# Patient Record
Sex: FEMALE | Race: OTHER | HISPANIC OR LATINO
[De-identification: names, ages, dates, MRNs, and addresses within clinical notes are randomized per-mention and may not be internally consistent; named-entity substitution may affect disease eponyms.]

---

## 2017-06-28 ENCOUNTER — RESULT REVIEW (OUTPATIENT)
Age: 57
End: 2017-06-28

## 2018-05-04 ENCOUNTER — APPOINTMENT (OUTPATIENT)
Dept: ORTHOPEDIC SURGERY | Facility: CLINIC | Age: 58
End: 2018-05-04
Payer: MEDICAID

## 2018-05-04 VITALS
HEIGHT: 59 IN | BODY MASS INDEX: 22.58 KG/M2 | SYSTOLIC BLOOD PRESSURE: 126 MMHG | DIASTOLIC BLOOD PRESSURE: 77 MMHG | HEART RATE: 73 BPM | WEIGHT: 112 LBS

## 2018-05-04 DIAGNOSIS — M51.36 OTHER INTERVERTEBRAL DISC DEGENERATION, LUMBAR REGION: ICD-10-CM

## 2018-05-04 DIAGNOSIS — Z78.9 OTHER SPECIFIED HEALTH STATUS: ICD-10-CM

## 2018-05-04 DIAGNOSIS — Z80.3 FAMILY HISTORY OF MALIGNANT NEOPLASM OF BREAST: ICD-10-CM

## 2018-05-04 DIAGNOSIS — Z56.0 UNEMPLOYMENT, UNSPECIFIED: ICD-10-CM

## 2018-05-04 DIAGNOSIS — Z60.2 PROBLEMS RELATED TO LIVING ALONE: ICD-10-CM

## 2018-05-04 DIAGNOSIS — Z87.39 PERSONAL HISTORY OF OTHER DISEASES OF THE MUSCULOSKELETAL SYSTEM AND CONNECTIVE TISSUE: ICD-10-CM

## 2018-05-04 PROCEDURE — 99204 OFFICE O/P NEW MOD 45 MIN: CPT

## 2018-05-04 PROCEDURE — 72100 X-RAY EXAM L-S SPINE 2/3 VWS: CPT

## 2018-05-04 SDOH — SOCIAL STABILITY - SOCIAL INSECURITY: PROBLEMS RELATED TO LIVING ALONE: Z60.2

## 2018-05-04 SDOH — ECONOMIC STABILITY - INCOME SECURITY: UNEMPLOYMENT, UNSPECIFIED: Z56.0

## 2018-09-05 ENCOUNTER — LABORATORY RESULT (OUTPATIENT)
Age: 58
End: 2018-09-05

## 2018-09-06 ENCOUNTER — OUTPATIENT (OUTPATIENT)
Dept: OUTPATIENT SERVICES | Facility: HOSPITAL | Age: 58
LOS: 1 days | Discharge: HOME | End: 2018-09-06

## 2018-09-06 ENCOUNTER — APPOINTMENT (OUTPATIENT)
Dept: OTOLARYNGOLOGY | Facility: CLINIC | Age: 58
End: 2018-09-06
Payer: MEDICAID

## 2018-09-06 VITALS
HEIGHT: 59 IN | HEART RATE: 65 BPM | BODY MASS INDEX: 22.58 KG/M2 | WEIGHT: 112 LBS | DIASTOLIC BLOOD PRESSURE: 67 MMHG | SYSTOLIC BLOOD PRESSURE: 109 MMHG

## 2018-09-06 DIAGNOSIS — K14.8 OTHER DISEASES OF TONGUE: ICD-10-CM

## 2018-09-06 PROCEDURE — 41100 BIOPSY OF TONGUE: CPT

## 2018-09-06 PROCEDURE — 99204 OFFICE O/P NEW MOD 45 MIN: CPT | Mod: 25

## 2018-09-10 DIAGNOSIS — K14.8 OTHER DISEASES OF TONGUE: ICD-10-CM

## 2018-09-27 ENCOUNTER — APPOINTMENT (OUTPATIENT)
Dept: OTOLARYNGOLOGY | Facility: CLINIC | Age: 58
End: 2018-09-27
Payer: MEDICAID

## 2018-09-27 VITALS
HEIGHT: 59 IN | HEART RATE: 74 BPM | BODY MASS INDEX: 22.58 KG/M2 | WEIGHT: 112 LBS | SYSTOLIC BLOOD PRESSURE: 113 MMHG | DIASTOLIC BLOOD PRESSURE: 73 MMHG

## 2018-09-27 PROCEDURE — 99213 OFFICE O/P EST LOW 20 MIN: CPT

## 2018-10-02 DIAGNOSIS — K13.70 UNSPECIFIED LESIONS OF ORAL MUCOSA: ICD-10-CM

## 2019-03-19 ENCOUNTER — OUTPATIENT (OUTPATIENT)
Dept: OUTPATIENT SERVICES | Facility: HOSPITAL | Age: 59
LOS: 1 days | End: 2019-03-19
Payer: COMMERCIAL

## 2019-03-19 VITALS
RESPIRATION RATE: 14 BRPM | DIASTOLIC BLOOD PRESSURE: 68 MMHG | OXYGEN SATURATION: 98 % | HEART RATE: 73 BPM | SYSTOLIC BLOOD PRESSURE: 111 MMHG | TEMPERATURE: 98 F | WEIGHT: 109.35 LBS | HEIGHT: 59.5 IN

## 2019-03-19 DIAGNOSIS — T85.41XA BREAKDOWN (MECHANICAL) OF BREAST PROSTHESIS AND IMPLANT, INITIAL ENCOUNTER: ICD-10-CM

## 2019-03-19 DIAGNOSIS — Z01.818 ENCOUNTER FOR OTHER PREPROCEDURAL EXAMINATION: ICD-10-CM

## 2019-03-19 DIAGNOSIS — R92.2 INCONCLUSIVE MAMMOGRAM: ICD-10-CM

## 2019-03-19 DIAGNOSIS — Z98.82 BREAST IMPLANT STATUS: Chronic | ICD-10-CM

## 2019-03-19 DIAGNOSIS — T85.44XA CAPSULAR CONTRACTURE OF BREAST IMPLANT, INITIAL ENCOUNTER: ICD-10-CM

## 2019-03-19 PROCEDURE — G0463: CPT

## 2019-03-19 PROCEDURE — 36415 COLL VENOUS BLD VENIPUNCTURE: CPT

## 2019-03-19 NOTE — H&P PST ADULT - NSANTHOSAYNRD_GEN_A_CORE
neck circumference 12.25inches/No. FARZANA screening performed.  STOP BANG Legend: 0-2 = LOW Risk; 3-4 = INTERMEDIATE Risk; 5-8 = HIGH Risk

## 2019-03-19 NOTE — H&P PST ADULT - NSICDXPROBLEM_GEN_ALL_CORE_FT
PROBLEM DIAGNOSES  Problem: Inconclusive mammogram  Assessment and Plan: Right Breast Excisional Biopsy w/Mammo Localization, Revision Bilateral Breast Implants, Reconstruction Right Breast w/Removal and Replacement of Breast Implant scheduled for 3/22/2019  Pre-op instructions given. Pt verbalized understanding  Pepcid given for GI prophylaxis  Chlorhexidine wash instructions  Pt instructed to stop ASA, MVI, Herbal supplement 7 days before procedure   Pending: medical clearance - requested by surgeon     Problem: Breakdown (mechanical) of breast prosthesis and implant, initial encounter  Assessment and Plan:

## 2019-03-19 NOTE — H&P PST ADULT - HISTORY OF PRESENT ILLNESS
59yo female denies significant medical history report mammogram done showed 2/2019 showed right breast mass (Papilloma) - biopsy indicate benign mass. Pt report surgical h/o bilateral breast implants, cosmetics and now experiencing problems with right breast implant. Pt presents today for presurgical testing for Right Breast Excisional Biopsy w/Mammo Localization Revision Breast Implants, Reconstruction Right Breast w/Removal and Replacement of Breast Implant scheduled for 3/22/2019 57yo female denies significant medical history report mammogram done showed 2/2019 showed right breast mass (Papilloma) - biopsy indicate benign mass. Pt reports cosmetic surgical h/o bilateral breast implants, and now experiencing problems with right breast implant. Pt presents today for presurgical testing for Right Breast Excisional Biopsy w/Mammo Localization Revision Breast Implants, Reconstruction Right Breast w/Removal and Replacement of Breast Implant scheduled for 3/22/2019

## 2019-03-19 NOTE — H&P PST ADULT - MUSCULOSKELETAL
negative detailed exam no joint erythema/normal strength/ROM intact/no joint swelling/no joint warmth/no calf tenderness

## 2019-03-21 ENCOUNTER — TRANSCRIPTION ENCOUNTER (OUTPATIENT)
Age: 59
End: 2019-03-21

## 2019-03-22 ENCOUNTER — RESULT REVIEW (OUTPATIENT)
Age: 59
End: 2019-03-22

## 2019-03-22 ENCOUNTER — OUTPATIENT (OUTPATIENT)
Dept: OUTPATIENT SERVICES | Facility: HOSPITAL | Age: 59
LOS: 1 days | End: 2019-03-22
Payer: COMMERCIAL

## 2019-03-22 VITALS
SYSTOLIC BLOOD PRESSURE: 103 MMHG | RESPIRATION RATE: 14 BRPM | HEART RATE: 54 BPM | TEMPERATURE: 98 F | OXYGEN SATURATION: 99 % | WEIGHT: 109.35 LBS | DIASTOLIC BLOOD PRESSURE: 68 MMHG | HEIGHT: 59.5 IN

## 2019-03-22 VITALS
SYSTOLIC BLOOD PRESSURE: 110 MMHG | DIASTOLIC BLOOD PRESSURE: 76 MMHG | HEART RATE: 84 BPM | TEMPERATURE: 98 F | RESPIRATION RATE: 16 BRPM

## 2019-03-22 DIAGNOSIS — N63.10 UNSPECIFIED LUMP IN THE RIGHT BREAST, UNSPECIFIED QUADRANT: ICD-10-CM

## 2019-03-22 DIAGNOSIS — T85.41XA BREAKDOWN (MECHANICAL) OF BREAST PROSTHESIS AND IMPLANT, INITIAL ENCOUNTER: ICD-10-CM

## 2019-03-22 DIAGNOSIS — Z98.82 BREAST IMPLANT STATUS: Chronic | ICD-10-CM

## 2019-03-22 DIAGNOSIS — T85.44XA CAPSULAR CONTRACTURE OF BREAST IMPLANT, INITIAL ENCOUNTER: ICD-10-CM

## 2019-03-22 DIAGNOSIS — R92.2 INCONCLUSIVE MAMMOGRAM: ICD-10-CM

## 2019-03-22 DIAGNOSIS — T85.43XA LEAKAGE OF BREAST PROSTHESIS AND IMPLANT, INITIAL ENCOUNTER: ICD-10-CM

## 2019-03-22 PROCEDURE — 19125 EXCISION BREAST LESION: CPT | Mod: RT

## 2019-03-22 PROCEDURE — 14301 TIS TRNFR ANY 30.1-60 SQ CM: CPT

## 2019-03-22 PROCEDURE — 19340 INSJ BREAST IMPLT SM D MAST: CPT | Mod: RT

## 2019-03-22 PROCEDURE — 88300 SURGICAL PATH GROSS: CPT | Mod: 26,59

## 2019-03-22 PROCEDURE — 88307 TISSUE EXAM BY PATHOLOGIST: CPT

## 2019-03-22 PROCEDURE — 19281 PERQ DEVICE BREAST 1ST IMAG: CPT

## 2019-03-22 PROCEDURE — C1889: CPT

## 2019-03-22 PROCEDURE — 19330 RMVL RUPTURED BREAST IMPLANT: CPT | Mod: RT

## 2019-03-22 PROCEDURE — 14302 TIS TRNFR ADDL 30 SQ CM: CPT

## 2019-03-22 PROCEDURE — 76098 X-RAY EXAM SURGICAL SPECIMEN: CPT

## 2019-03-22 PROCEDURE — C1789: CPT

## 2019-03-22 PROCEDURE — 19281 PERQ DEVICE BREAST 1ST IMAG: CPT | Mod: RT

## 2019-03-22 PROCEDURE — 88300 SURGICAL PATH GROSS: CPT

## 2019-03-22 PROCEDURE — 76098 X-RAY EXAM SURGICAL SPECIMEN: CPT | Mod: 26

## 2019-03-22 PROCEDURE — 19371 PERI-IMPLT CAPSLC BRST COMPL: CPT | Mod: 50

## 2019-03-22 PROCEDURE — ZZZZZ: CPT

## 2019-03-22 PROCEDURE — 88307 TISSUE EXAM BY PATHOLOGIST: CPT | Mod: 26

## 2019-03-22 RX ORDER — SODIUM CHLORIDE 9 MG/ML
1000 INJECTION, SOLUTION INTRAVENOUS
Qty: 0 | Refills: 0 | Status: DISCONTINUED | OUTPATIENT
Start: 2019-03-22 | End: 2019-03-22

## 2019-03-22 RX ORDER — LECITHIN 1200 MG
1 CAPSULE ORAL
Qty: 0 | Refills: 0 | COMMUNITY

## 2019-03-22 RX ORDER — OMEGA-3 ACID ETHYL ESTERS 1 G
2 CAPSULE ORAL
Qty: 0 | Refills: 0 | COMMUNITY

## 2019-03-22 RX ORDER — ASCORBIC ACID 60 MG
1 TABLET,CHEWABLE ORAL
Qty: 0 | Refills: 0 | COMMUNITY

## 2019-03-22 RX ORDER — HYDROMORPHONE HYDROCHLORIDE 2 MG/ML
1 INJECTION INTRAMUSCULAR; INTRAVENOUS; SUBCUTANEOUS
Qty: 0 | Refills: 0 | Status: DISCONTINUED | OUTPATIENT
Start: 2019-03-22 | End: 2019-03-22

## 2019-03-22 RX ORDER — CEPHALEXIN 500 MG
1 CAPSULE ORAL
Qty: 28 | Refills: 0 | OUTPATIENT
Start: 2019-03-22 | End: 2019-03-28

## 2019-03-22 RX ORDER — VITAMIN E 100 UNIT
1 CAPSULE ORAL
Qty: 0 | Refills: 0 | COMMUNITY

## 2019-03-22 RX ORDER — OXYCODONE HYDROCHLORIDE 5 MG/1
10 TABLET ORAL EVERY 6 HOURS
Qty: 0 | Refills: 0 | Status: DISCONTINUED | OUTPATIENT
Start: 2019-03-22 | End: 2019-03-22

## 2019-03-22 RX ORDER — HYDROMORPHONE HYDROCHLORIDE 2 MG/ML
0.5 INJECTION INTRAMUSCULAR; INTRAVENOUS; SUBCUTANEOUS
Qty: 0 | Refills: 0 | Status: DISCONTINUED | OUTPATIENT
Start: 2019-03-22 | End: 2019-03-22

## 2019-03-22 RX ORDER — OXYCODONE HYDROCHLORIDE 5 MG/1
5 TABLET ORAL EVERY 6 HOURS
Qty: 0 | Refills: 0 | Status: DISCONTINUED | OUTPATIENT
Start: 2019-03-22 | End: 2019-03-22

## 2019-03-22 RX ORDER — CEPHALEXIN 500 MG
1 CAPSULE ORAL
Qty: 0 | Refills: 0 | COMMUNITY

## 2019-03-22 RX ORDER — ONDANSETRON 8 MG/1
4 TABLET, FILM COATED ORAL ONCE
Qty: 0 | Refills: 0 | Status: DISCONTINUED | OUTPATIENT
Start: 2019-03-22 | End: 2019-03-22

## 2019-03-22 RX ORDER — PREGABALIN 225 MG/1
1 CAPSULE ORAL
Qty: 0 | Refills: 0 | COMMUNITY

## 2019-03-22 RX ADMIN — SODIUM CHLORIDE 50 MILLILITER(S): 9 INJECTION, SOLUTION INTRAVENOUS at 13:03

## 2019-03-22 NOTE — ASU DISCHARGE PLAN (ADULT/PEDIATRIC) - CARE PROVIDER_API CALL
Angela Escobar)  [facility]  [address], [address]  [place] [address]  Phone: [phone]  Fax: [phone]  Follow Up Time: 2 weeks    Lefty Valdivia)  Plastic Surgery  [address] [place] [address]  Phone: [phone]  Fax: [phone]  Follow Up Time: 1 week

## 2019-03-22 NOTE — BRIEF OPERATIVE NOTE - NSICDXBRIEFPROCEDURE_GEN_ALL_CORE_FT
PROCEDURES:  Breast implant revision 22-Mar-2019 17:02:19  Alisa Olivas  Removal, ruptured implant, breast 22-Mar-2019 17:01:49  Alisa Olivas  Excision, mass, breast 22-Mar-2019 17:01:27  Alisa Olivas

## 2019-03-22 NOTE — ASU DISCHARGE PLAN (ADULT/PEDIATRIC) - PROVIDER TOKENS
PROVIDER:[TOKEN:[[id number]],FOLLOWUP:[2 weeks]],PROVIDER:[TOKEN:[[id number]],FOLLOWUP:[1 week]]

## 2019-03-22 NOTE — ASU DISCHARGE PLAN (ADULT/PEDIATRIC) - CALL YOUR DOCTOR IF YOU HAVE ANY OF THE FOLLOWING:
Nausea and vomiting that does not stop/Bleeding that does not stop/Wound/Surgical Site with redness, or foul smelling discharge or pus/Fever greater than (need to indicate Fahrenheit or Celsius)/Swelling that gets worse/Pain not relieved by Medications

## 2019-03-22 NOTE — BRIEF OPERATIVE NOTE - NSICDXBRIEFPREOP_GEN_ALL_CORE_FT
PRE-OP DIAGNOSIS:  Ruptured right breast implant 22-Mar-2019 17:03:30  Alisa Olivas  Breast mass, right 22-Mar-2019 17:03:03  Alisa Olivas

## 2019-03-28 LAB — SURGICAL PATHOLOGY STUDY: SIGNIFICANT CHANGE UP

## 2019-04-10 ENCOUNTER — RESULT REVIEW (OUTPATIENT)
Age: 59
End: 2019-04-10

## 2019-05-04 ENCOUNTER — TRANSCRIPTION ENCOUNTER (OUTPATIENT)
Age: 59
End: 2019-05-04

## 2019-10-02 PROBLEM — Z60.2 PERSON LIVING ALONE: Status: ACTIVE | Noted: 2018-05-04

## 2019-10-23 ENCOUNTER — RESULT REVIEW (OUTPATIENT)
Age: 59
End: 2019-10-23

## 2021-02-20 ENCOUNTER — EMERGENCY (EMERGENCY)
Facility: HOSPITAL | Age: 61
LOS: 1 days | Discharge: HOME | End: 2021-02-20
Attending: STUDENT IN AN ORGANIZED HEALTH CARE EDUCATION/TRAINING PROGRAM
Payer: COMMERCIAL

## 2021-02-20 VITALS
DIASTOLIC BLOOD PRESSURE: 89 MMHG | OXYGEN SATURATION: 99 % | HEART RATE: 73 BPM | TEMPERATURE: 99 F | RESPIRATION RATE: 18 BRPM | SYSTOLIC BLOOD PRESSURE: 141 MMHG

## 2021-02-20 VITALS
HEIGHT: 59.5 IN | WEIGHT: 149.91 LBS | SYSTOLIC BLOOD PRESSURE: 155 MMHG | DIASTOLIC BLOOD PRESSURE: 100 MMHG | OXYGEN SATURATION: 100 % | HEART RATE: 85 BPM | RESPIRATION RATE: 18 BRPM | TEMPERATURE: 99 F

## 2021-02-20 DIAGNOSIS — Z98.82 BREAST IMPLANT STATUS: Chronic | ICD-10-CM

## 2021-02-20 LAB
ALBUMIN SERPL ELPH-MCNC: 4.5 G/DL — SIGNIFICANT CHANGE UP (ref 3.3–5.2)
ALP SERPL-CCNC: 102 U/L — SIGNIFICANT CHANGE UP (ref 40–120)
ALT FLD-CCNC: 20 U/L — SIGNIFICANT CHANGE UP
ANION GAP SERPL CALC-SCNC: 12 MMOL/L — SIGNIFICANT CHANGE UP (ref 5–17)
AST SERPL-CCNC: 25 U/L — SIGNIFICANT CHANGE UP
BASOPHILS # BLD AUTO: 0.07 K/UL — SIGNIFICANT CHANGE UP (ref 0–0.2)
BASOPHILS NFR BLD AUTO: 1.4 % — SIGNIFICANT CHANGE UP (ref 0–2)
BILIRUB SERPL-MCNC: 0.3 MG/DL — LOW (ref 0.4–2)
BUN SERPL-MCNC: 12 MG/DL — SIGNIFICANT CHANGE UP (ref 8–20)
CALCIUM SERPL-MCNC: 9.3 MG/DL — SIGNIFICANT CHANGE UP (ref 8.6–10.2)
CHLORIDE SERPL-SCNC: 100 MMOL/L — SIGNIFICANT CHANGE UP (ref 98–107)
CO2 SERPL-SCNC: 25 MMOL/L — SIGNIFICANT CHANGE UP (ref 22–29)
CREAT SERPL-MCNC: 0.62 MG/DL — SIGNIFICANT CHANGE UP (ref 0.5–1.3)
EOSINOPHIL # BLD AUTO: 0.07 K/UL — SIGNIFICANT CHANGE UP (ref 0–0.5)
EOSINOPHIL NFR BLD AUTO: 1.4 % — SIGNIFICANT CHANGE UP (ref 0–6)
GLUCOSE SERPL-MCNC: 131 MG/DL — HIGH (ref 70–99)
HCT VFR BLD CALC: 43.6 % — SIGNIFICANT CHANGE UP (ref 34.5–45)
HGB BLD-MCNC: 14.9 G/DL — SIGNIFICANT CHANGE UP (ref 11.5–15.5)
IMM GRANULOCYTES NFR BLD AUTO: 0.2 % — SIGNIFICANT CHANGE UP (ref 0–1.5)
LYMPHOCYTES # BLD AUTO: 2.78 K/UL — SIGNIFICANT CHANGE UP (ref 1–3.3)
LYMPHOCYTES # BLD AUTO: 55.9 % — HIGH (ref 13–44)
MCHC RBC-ENTMCNC: 31 PG — SIGNIFICANT CHANGE UP (ref 27–34)
MCHC RBC-ENTMCNC: 34.2 GM/DL — SIGNIFICANT CHANGE UP (ref 32–36)
MCV RBC AUTO: 90.8 FL — SIGNIFICANT CHANGE UP (ref 80–100)
MONOCYTES # BLD AUTO: 0.27 K/UL — SIGNIFICANT CHANGE UP (ref 0–0.9)
MONOCYTES NFR BLD AUTO: 5.4 % — SIGNIFICANT CHANGE UP (ref 2–14)
NEUTROPHILS # BLD AUTO: 1.77 K/UL — LOW (ref 1.8–7.4)
NEUTROPHILS NFR BLD AUTO: 35.7 % — LOW (ref 43–77)
PLATELET # BLD AUTO: 222 K/UL — SIGNIFICANT CHANGE UP (ref 150–400)
POTASSIUM SERPL-MCNC: 3.4 MMOL/L — LOW (ref 3.5–5.3)
POTASSIUM SERPL-SCNC: 3.4 MMOL/L — LOW (ref 3.5–5.3)
PROT SERPL-MCNC: 6.8 G/DL — SIGNIFICANT CHANGE UP (ref 6.6–8.7)
RBC # BLD: 4.8 M/UL — SIGNIFICANT CHANGE UP (ref 3.8–5.2)
RBC # FLD: 11.9 % — SIGNIFICANT CHANGE UP (ref 10.3–14.5)
SODIUM SERPL-SCNC: 137 MMOL/L — SIGNIFICANT CHANGE UP (ref 135–145)
WBC # BLD: 4.97 K/UL — SIGNIFICANT CHANGE UP (ref 3.8–10.5)
WBC # FLD AUTO: 4.97 K/UL — SIGNIFICANT CHANGE UP (ref 3.8–10.5)

## 2021-02-20 PROCEDURE — 99284 EMERGENCY DEPT VISIT MOD MDM: CPT

## 2021-02-20 PROCEDURE — 85025 COMPLETE CBC W/AUTO DIFF WBC: CPT

## 2021-02-20 PROCEDURE — 99283 EMERGENCY DEPT VISIT LOW MDM: CPT

## 2021-02-20 PROCEDURE — 80053 COMPREHEN METABOLIC PANEL: CPT

## 2021-02-20 PROCEDURE — 36415 COLL VENOUS BLD VENIPUNCTURE: CPT

## 2021-02-20 NOTE — ED PROVIDER NOTE - NS ED ROS FT
No fever/chills, No photophobia/eye pain/changes in vision, No ear pain/sore throat/dysphagia, (+) chest pain, no palpitations, no SOB/cough/wheeze/stridor, No abdominal pain, (+) N, no V/D, no dysuria/frequency/discharge, No neck/back pain, no rash, no changes in neurological status/function.

## 2021-02-20 NOTE — ED ADULT TRIAGE NOTE - CHIEF COMPLAINT QUOTE
per EMS pt was at [facility] for covid vacine #1 per EMS pt was given epi at 1456 d/t pt coughing and feeling like throat tightness but refused to go with EMS at the time.  EMS was recalled  to scene to re evaluate patient pt current complaint is shaking

## 2021-02-20 NOTE — ED PROVIDER NOTE - OBJECTIVE STATEMENT
59 y/o F pt with no significant PMHx presents to the ED c/o throat itchiness and chest pressure s/p receiving her 1st COVID vaccine approximately 30 minutes ago at a local vaccination site. Pt reports associated nausea and dizziness that began s/p receiving the vaccine. Patient received epinephrine by EMS and she now states that the previously mentioned sx have completely resolved. She is currently only c/o generalized weakness. Denies hx of cardiac etiologies and does not take medications on a daily basis. No further complaints at this time.

## 2021-02-20 NOTE — ED PROVIDER NOTE - NS ED MD EM SELECTION
Airway  Performed by: Jennifer Alicea MD  Authorized by: Jennifer Alicea MD     Final Airway Type:  Supraglottic airway  Final Supraglottic Airway:  Unique  SGA Size*:  2  Attempts*:  1   Patient Identified, Procedure confirmed, Emergency equipment available and Safety protocols followed  Location:  OR  Urgency:  Elective  Difficult Airway: No    Indications for Airway Management:  Anesthesia  Sedation Level:  Anesthetized  Mask Difficulty Assessment:  1 - vent by mask  Performed By:  Anesthesiologist  Anesthesiologist:  Jennifer Alicea MD        
[id number] Detailed

## 2021-02-20 NOTE — ED ADULT NURSE NOTE - CHIEF COMPLAINT QUOTE
per EMS pt was at [facility] for covid vacine #1 per EMS pt was given epi at 1456 d/t pt coughing and feeling like throat tightness but refused to go with EMS at the time.  EMS was recalled  to scene to re evaluate patient pt current complaint is shaking

## 2021-02-20 NOTE — ED PROVIDER NOTE - CLINICAL SUMMARY MEDICAL DECISION MAKING FREE TEXT BOX
labs reviewed. Pt feeling much better. Pt monitored for 4 h s/p epi.  Pt likely with mild allergic reaction to covid vaccine. Instructed to consult with pcp regarding second shot. instructed to return for worsening sob, rash, throat swelling, or any other concerns.  Pt given a copy of all results and instructed to f/up with pcp regarding any abnormal results.

## 2021-02-20 NOTE — ED PROVIDER NOTE - PATIENT PORTAL LINK FT
You can access the FollowMyHealth Patient Portal offered by [facility] by registering at the following website: [email or web address]. By joining [email or web address]’s FollowMyHealth portal, you will also be able to view your health information using other applications (apps) compatible with our system.

## 2021-02-20 NOTE — ED ADULT NURSE REASSESSMENT NOTE - NS ED NURSE REASSESS COMMENT FT1
received pt alert and orientex3,, vitals are stable, no distress, sating well on room air, safety maintained, continue to monitor Attending Attestation (For Attendings USE Only)...

## 2021-02-21 PROBLEM — Z78.9 OTHER SPECIFIED HEALTH STATUS: Chronic | Status: ACTIVE | Noted: 2019-03-19

## 2022-02-03 ENCOUNTER — APPOINTMENT (OUTPATIENT)
Dept: PEDIATRIC ALLERGY IMMUNOLOGY | Facility: CLINIC | Age: 62
End: 2022-02-03
Payer: COMMERCIAL

## 2022-02-03 DIAGNOSIS — Z86.39 PERSONAL HISTORY OF OTHER ENDOCRINE, NUTRITIONAL AND METABOLIC DISEASE: ICD-10-CM

## 2022-02-03 PROCEDURE — 99204 OFFICE O/P NEW MOD 45 MIN: CPT | Mod: 95

## 2022-02-07 NOTE — HISTORY OF PRESENT ILLNESS
[Home] : at home, [unfilled] , at the time of the visit. [Medical Office: ([facility])___] : at the medical office located in  [Verbal consent obtained from patient] : the patient, [unfilled] [de-identified] : Ms. James is a 61 year old woman with a history of a reaction to the COVID vaccine who presents for allergy evaluation.\par \par Jan 18, 2021 - Moderna #1 - inflammation of her arm.\par Feb 20, 2021 - Moderna #2\par 3:30PM\par Within a few seconds she felt something run from her head to her toes. Mentioned it to medical staff at the site. [place] like an "electrical shock."  Went to waiting room. [place] dizzy, nausea, room felt like it was spinning. Had not eaten since early in the AM.\par She was given a banana thinking it was low blood sugar but but did not eat it. Felt nausea. Then started coughing.\par Then had itching in her throat. Felt palpitations - BP was elevated ~170. Throat tightness - felt like something was stuck in her throat.\par She was administered the epipen (went to a stadium in [place]). Itching subsided and cough stopped. \par Called 911 and ambulance came. \par Went to the hospital for 5 hours of observation. Was given "something IV" and EKG was done (reportedly normal). She was not advised to take any meds at home. [place] headache when she was discharged.\par \par Spent the next week at her daughter's and she had h/a constipation, felt itchy.\par \par Had COVID infection Jan 4th - had headaches like never before. \par Bad itching and bad headaches.\par \par Did you receive the Moderna or Pfizer vaccine?  Moderna\par Have you ever had a?previous?allergic/anaphylactic reaction for which an EpiPen?(or other epinephrine autoinjector)?was required??? \par NO\par Do you have any history of allergies to medications/drugs?? \par ?penicillin but unsure\par Have you ever had a reaction to injectable steroids or any other injectable medication? \par Never had\par Do you have any history of allergic reactions to vaccines??? \par NO - tolerated shingles shot, flu shot\par Do you have any history of allergies to foods??? \par NO\par Do you have any history of allergy to IV contrast??? \par Never had\par Do you have any history of allergic reaction to bee sting?or other stinging insects?? \par NO\par Do you have any history of environmental allergies/hay fever??? \par POLLEN\par Do you have any?history of asthma??? \par YES In her 20s - last albuterol use was 26\par Do you have any?history of?eczema or?atopic dermatitis??? \par NO\par Do you have any?history of contact dermatitis??? \par NO\par Do you have any?history of hives?(urticaria)?or angioedema?? \par NO\par Do you have any?history of a mast cell disorder??? \par NO\par Do you have any?history of any other allergic reaction of any kind??? \par NO\par Have you ever had a colonoscopy???When was your last colonoscopy? \par YES - 2 years ago\par Have you had a reaction to preparation for colonoscopy? When? \par Tolerated bowel prep\par Have you ever used?Miralax?(polyethylene glycol) or another laxative?? When was your last use? \par NO\par Do you have any cosmetic fillers? When was the last procedure? \par YES - botox on her forehead and above eyebrows. Also had a filler of  vitamin C with hyaluronic acid - itchy for a few weeks and made a "bump" inside - now gone- Nov 2021.\par Were you infected with?SARS-COV2?to your knowledge?(Have you ever had COVID-19)???  \par YES - Jan 2022.\par Have you received passive antibody therapy (monoclonal antibodies or convalescent serum) as treatment for COVID-19?? \par NO\par Do you take a beta-blocker??? \par NO\par Do you take an ACE inhibitor?? \par NO\par Do you use NSAIDs?? Did you take NSAIDs within 24 hours of your vaccine?? \par NO\par Do you have a history of vasovagal reactions (fainting after blood draw, shots or other situations)??? \par NO\par Do you have a history of anxiety or panic attacks??? \par NO\par What is your occupation? \par Works as a  in [employer]\par Do you work with or have a hobby working with automobiles? \par NO\par What was the timing of your reaction? How soon did you feel any symptoms???  immediately\par What were your symptoms?after receiving the COVID-19 vaccine?in chronological order??? \par Did you feel any:?? \par Itchiness? YES - throat and body\par Rash/urticaria? NO\par flushing NO\par Swelling? NO\par Difficulty swallowing/lump in throat?  YES\par Shortness of breath/wheezing/cough?? YES SOB and cough, no wheeze\par Abdominal pain/cramping/vomiting/diarrhea? NO\par Dizziness/Lightheadedness/Fainting/Loss of Consciousness? YES dizziness and lightheaded\par Impending sense of doom? YES\par What medications were you treated with??? Something in the IV but does not recall - will ask her son\par Benadryl (or H1 blocker); which one?? unsure\par Pepcid (or another H2 blocker); which one?? unsure\par Steroids (PO/IV/IM); which one?? unsure\par Montelukast? unsure\par Epinephrine? YES\par \par YES hx of reflux - no longer has sx because she has modified her diet.

## 2022-02-07 NOTE — REVIEW OF SYSTEMS
[Nl] : Genitourinary [Immunizations are up to date] : Immunizations are up to date [Received Influenza Vaccine this Past Year] : patient has received the Influenza vaccine this past year [FreeTextEntry2] : headaches

## 2022-02-07 NOTE — SOCIAL HISTORY
[Apartment] : [unfilled] lives in an apartment  [None] : none [Smokers in Household] : there are no smokers in the home [de-identified] : studio [de-identified] : lives with BF

## 2022-02-07 NOTE — CONSULT LETTER
[Dear  ___] : Dear  [unfilled], [Consult Letter:] : I had the pleasure of evaluating your patient, [unfilled]. [Please see my note below.] : Please see my note below. [Consult Closing:] : Thank you very much for allowing me to participate in the care of this patient.  If you have any questions, please do not hesitate to contact me. [Sincerely,] : Sincerely, [FreeTextEntry2] : Erik Benitez MD [FreeTextEntry3] : Lisa Chaney MD\par Attending Physician \par Division of Allergy/Immunology \par [facility] \par \par  of Medicine and Pediatrics\par [facility] \par \par [address]\[place] [address]\par Tel: [phone]\par Fax: [phone]\par Email: [email or web address]\par \par \par \par

## 2022-03-23 ENCOUNTER — APPOINTMENT (OUTPATIENT)
Dept: PEDIATRIC ALLERGY IMMUNOLOGY | Facility: CLINIC | Age: 62
End: 2022-03-23
Payer: COMMERCIAL

## 2022-03-23 VITALS
WEIGHT: 111.99 LBS | BODY MASS INDEX: 22.58 KG/M2 | OXYGEN SATURATION: 75 % | SYSTOLIC BLOOD PRESSURE: 106 MMHG | HEIGHT: 59 IN | HEART RATE: 75 BPM | DIASTOLIC BLOOD PRESSURE: 68 MMHG | TEMPERATURE: 96.8 F

## 2022-03-23 PROCEDURE — 99213 OFFICE O/P EST LOW 20 MIN: CPT | Mod: 25

## 2022-03-23 PROCEDURE — 95004 PERQ TESTS W/ALRGNC XTRCS: CPT

## 2022-03-23 RX ORDER — CETIRIZINE HYDROCHLORIDE 10 MG/1
10 TABLET, COATED ORAL
Qty: 1 | Refills: 1 | Status: ACTIVE | COMMUNITY
Start: 2022-03-23 | End: 1900-01-01

## 2022-03-23 RX ORDER — OMEPRAZOLE 20 MG/1
20 CAPSULE, DELAYED RELEASE ORAL
Qty: 3 | Refills: 0 | Status: ACTIVE | COMMUNITY
Start: 2022-03-23 | End: 1900-01-01

## 2022-03-23 RX ORDER — FAMOTIDINE 20 MG/1
20 TABLET, FILM COATED ORAL
Qty: 30 | Refills: 0 | Status: ACTIVE | COMMUNITY
Start: 2022-03-23 | End: 1900-01-01

## 2022-03-31 ENCOUNTER — NON-APPOINTMENT (OUTPATIENT)
Age: 62
End: 2022-03-31

## 2022-03-31 ENCOUNTER — APPOINTMENT (OUTPATIENT)
Dept: PEDIATRIC ALLERGY IMMUNOLOGY | Facility: CLINIC | Age: 62
End: 2022-03-31
Payer: COMMERCIAL

## 2022-03-31 VITALS
DIASTOLIC BLOOD PRESSURE: 84 MMHG | TEMPERATURE: 96.98 F | OXYGEN SATURATION: 97 % | HEART RATE: 63 BPM | SYSTOLIC BLOOD PRESSURE: 124 MMHG

## 2022-03-31 VITALS
SYSTOLIC BLOOD PRESSURE: 117 MMHG | HEART RATE: 67 BPM | DIASTOLIC BLOOD PRESSURE: 75 MMHG | OXYGEN SATURATION: 98 % | RESPIRATION RATE: 16 BRPM

## 2022-03-31 DIAGNOSIS — Z23 ENCOUNTER FOR IMMUNIZATION: ICD-10-CM

## 2022-03-31 DIAGNOSIS — T50.B95A ADVERSE EFFECT OF OTHER VIRAL VACCINES, INITIAL ENCOUNTER: ICD-10-CM

## 2022-03-31 PROCEDURE — 99214 OFFICE O/P EST MOD 30 MIN: CPT | Mod: 25

## 2022-03-31 PROCEDURE — 0004A: CPT

## 2022-04-01 NOTE — HISTORY OF PRESENT ILLNESS
[de-identified] : Ms. James is a 61 year old woman with a history of a reaction to the COVID vaccine who presents for allergy evaluation.\par \par Took omeprazole 20mg daily for the last 3 days, as well as cetirizine 10mg and famotidine 20 mg last night and an hour ago.\par Feels well today. Still having some back pain but pt has not taken and NSAIDs.\par Last advil was 2 days ago - 400mg.\par \par Appt 2/3/22:\par Jan 18, 2021 - Moderna #1 - inflammation of her arm.\par Feb 20, 2021 - Moderna #2\par 3:30PM\par Within a few seconds she felt something run from her head to her toes. Mentioned it to medical staff at the site. [place] like an "electrical shock."  Went to waiting room. [place] dizzy, nausea, room felt like it was spinning. Had not eaten since early in the AM.\par She was given a banana thinking it was low blood sugar but but did not eat it. Felt nausea. Then started coughing.\par Then had itching in her throat. Felt palpitations - BP was elevated ~170. Throat tightness - felt like something was stuck in her throat.\par She was administered the epipen (went to a stadium in [place]). Itching subsided and cough stopped. \par Called 911 and ambulance came. \elizabeth Went to the hospital for 5 hours of observation. Was given "something IV" and EKG was done (reportedly normal). She was not advised to take any meds at home. [place] headache when she was discharged.\par \par Spent the next week at her daughter's and she had h/a constipation, felt itchy.\par \par Had COVID infection Jan 4th - had headaches like never before. \par Bad itching and bad headaches.\par \par Did you receive the Moderna or Pfizer vaccine?  Moderna\par Have you ever had a?previous?allergic/anaphylactic reaction for which an EpiPen?(or other epinephrine autoinjector)?was required??? \par NO\par Do you have any history of allergies to medications/drugs?? \par ?penicillin but unsure\par Have you ever had a reaction to injectable steroids or any other injectable medication? \par Never had\par Do you have any history of allergic reactions to vaccines??? \par NO - tolerated shingles shot, flu shot\par Do you have any history of allergies to foods??? \par NO\par Do you have any history of allergy to IV contrast??? \par Never had\par Do you have any history of allergic reaction to bee sting?or other stinging insects?? \par NO\par Do you have any history of environmental allergies/hay fever??? \par POLLEN\par Do you have any?history of asthma??? \par YES In her 20s - last albuterol use was 26\par Do you have any?history of?eczema or?atopic dermatitis??? \par NO\par Do you have any?history of contact dermatitis??? \par NO\par Do you have any?history of hives?(urticaria)?or angioedema?? \par NO\par Do you have any?history of a mast cell disorder??? \par NO\par Do you have any?history of any other allergic reaction of any kind??? \par NO\par Have you ever had a colonoscopy???When was your last colonoscopy? \par YES - 2 years ago\par Have you had a reaction to preparation for colonoscopy? When? \par Tolerated bowel prep\par Have you ever used?Miralax?(polyethylene glycol) or another laxative?? When was your last use? \par NO\par Do you have any cosmetic fillers? When was the last procedure? \par YES - botox on her forehead and above eyebrows. Also had a filler of  vitamin C with hyaluronic acid - itchy for a few weeks and made a "bump" inside - now gone- Nov 2021.\par Were you infected with?SARS-COV2?to your knowledge?(Have you ever had COVID-19)???  \par YES - Jan 2022.\par Have you received passive antibody therapy (monoclonal antibodies or convalescent serum) as treatment for COVID-19?? \par NO\par Do you take a beta-blocker??? \par NO\par Do you take an ACE inhibitor?? \par NO\par Do you use NSAIDs?? Did you take NSAIDs within 24 hours of your vaccine?? \par NO\par Do you have a history of vasovagal reactions (fainting after blood draw, shots or other situations)??? \par NO\par Do you have a history of anxiety or panic attacks??? \par NO\par What is your occupation? \par Works as a  in [employer]\par Do you work with or have a hobby working with automobiles? \par NO\par What was the timing of your reaction? How soon did you feel any symptoms???  immediately\par What were your symptoms?after receiving the COVID-19 vaccine?in chronological order??? \par Did you feel any:?? \par Itchiness? YES - throat and body\par Rash/urticaria? NO\par flushing NO\par Swelling? NO\par Difficulty swallowing/lump in throat?  YES\par Shortness of breath/wheezing/cough?? YES SOB and cough, no wheeze\par Abdominal pain/cramping/vomiting/diarrhea? NO\par Dizziness/Lightheadedness/Fainting/Loss of Consciousness? YES dizziness and lightheaded\par Impending sense of doom? YES\par What medications were you treated with??? Something in the IV but does not recall - will ask her son\par Benadryl (or H1 blocker); which one?? unsure\par Pepcid (or another H2 blocker); which one?? unsure\par Steroids (PO/IV/IM); which one?? unsure\par Montelukast? unsure\par Epinephrine? YES\par \par YES hx of reflux - no longer has sx because she has modified her diet.

## 2022-04-01 NOTE — CONSULT LETTER
[Dear  ___] : Dear  [unfilled], [Consult Letter:] : I had the pleasure of evaluating your patient, [unfilled]. [Please see my note below.] : Please see my note below. [Consult Closing:] : Thank you very much for allowing me to participate in the care of this patient.  If you have any questions, please do not hesitate to contact me. [Sincerely,] : Sincerely, [FreeTextEntry2] : Erik Benitez MD [FreeTextEntry3] : Lisa Chaney MD\par Attending Physician \par Division of Allergy/Immunology \par [facility] \par \par  of Medicine and Pediatrics\par [facility] \par \par [address]\[place] [address]\par Tel: [phone]\par Fax: [phone]\par Email: [email or web address]\par \par \par \par

## 2022-04-01 NOTE — SOCIAL HISTORY
[Apartment] : [unfilled] lives in an apartment  [None] : none [Smokers in Household] : there are no smokers in the home [de-identified] : studio [de-identified] : lives with BF

## 2022-04-11 NOTE — HISTORY OF PRESENT ILLNESS
[de-identified] : Ms. James is a 61 year old woman with a history of a reaction to the COVID vaccine who presents for allergy evaluation.\par \par \par \par Appt 2/3/22:\par Jan 18, 2021 - Moderna #1 - inflammation of her arm.\par Feb 20, 2021 - Moderna #2\par 3:30PM\par Within a few seconds she felt something run from her head to her toes. Mentioned it to medical staff at the site. [place] like an "electrical shock."  Went to waiting room. [place] dizzy, nausea, room felt like it was spinning. Had not eaten since early in the AM.\par She was given a banana thinking it was low blood sugar but but did not eat it. Felt nausea. Then started coughing.\par Then had itching in her throat. Felt palpitations - BP was elevated ~170. Throat tightness - felt like something was stuck in her throat.\par She was administered the epipen (went to a stadium in [place]). Itching subsided and cough stopped. \par Called 911 and ambulance came. \par Went to the hospital for 5 hours of observation. Was given "something IV" and EKG was done (reportedly normal). She was not advised to take any meds at home. [place] headache when she was discharged.\par \par Spent the next week at her daughter's and she had h/a constipation, felt itchy.\par \par Had COVID infection Jan 4th - had headaches like never before. \par Bad itching and bad headaches.\par \par Did you receive the Moderna or Pfizer vaccine?  Moderna\par Have you ever had a?previous?allergic/anaphylactic reaction for which an EpiPen?(or other epinephrine autoinjector)?was required??? \par NO\par Do you have any history of allergies to medications/drugs?? \par ?penicillin but unsure\par Have you ever had a reaction to injectable steroids or any other injectable medication? \par Never had\par Do you have any history of allergic reactions to vaccines??? \par NO - tolerated shingles shot, flu shot\par Do you have any history of allergies to foods??? \par NO\par Do you have any history of allergy to IV contrast??? \par Never had\par Do you have any history of allergic reaction to bee sting?or other stinging insects?? \par NO\par Do you have any history of environmental allergies/hay fever??? \par POLLEN\par Do you have any?history of asthma??? \par YES In her 20s - last albuterol use was 26\par Do you have any?history of?eczema or?atopic dermatitis??? \par NO\par Do you have any?history of contact dermatitis??? \par NO\par Do you have any?history of hives?(urticaria)?or angioedema?? \par NO\par Do you have any?history of a mast cell disorder??? \par NO\par Do you have any?history of any other allergic reaction of any kind??? \par NO\par Have you ever had a colonoscopy???When was your last colonoscopy? \par YES - 2 years ago\par Have you had a reaction to preparation for colonoscopy? When? \par Tolerated bowel prep\par Have you ever used?Miralax?(polyethylene glycol) or another laxative?? When was your last use? \par NO\par Do you have any cosmetic fillers? When was the last procedure? \par YES - botox on her forehead and above eyebrows. Also had a filler of  vitamin C with hyaluronic acid - itchy for a few weeks and made a "bump" inside - now gone- Nov 2021.\par Were you infected with?SARS-COV2?to your knowledge?(Have you ever had COVID-19)???  \par YES - Jan 2022.\par Have you received passive antibody therapy (monoclonal antibodies or convalescent serum) as treatment for COVID-19?? \par NO\par Do you take a beta-blocker??? \par NO\par Do you take an ACE inhibitor?? \par NO\par Do you use NSAIDs?? Did you take NSAIDs within 24 hours of your vaccine?? \par NO\par Do you have a history of vasovagal reactions (fainting after blood draw, shots or other situations)??? \par NO\par Do you have a history of anxiety or panic attacks??? \par NO\par What is your occupation? \par Works as a  in [employer]\par Do you work with or have a hobby working with automobiles? \par NO\par What was the timing of your reaction? How soon did you feel any symptoms???  immediately\par What were your symptoms?after receiving the COVID-19 vaccine?in chronological order??? \par Did you feel any:?? \par Itchiness? YES - throat and body\par Rash/urticaria? NO\par flushing NO\par Swelling? NO\par Difficulty swallowing/lump in throat?  YES\par Shortness of breath/wheezing/cough?? YES SOB and cough, no wheeze\par Abdominal pain/cramping/vomiting/diarrhea? NO\par Dizziness/Lightheadedness/Fainting/Loss of Consciousness? YES dizziness and lightheaded\par Impending sense of doom? YES\par What medications were you treated with??? Something in the IV but does not recall - will ask her son\par Benadryl (or H1 blocker); which one?? unsure\par Pepcid (or another H2 blocker); which one?? unsure\par Steroids (PO/IV/IM); which one?? unsure\par Montelukast? unsure\par Epinephrine? YES\par \par YES hx of reflux - no longer has sx because she has modified her diet.

## 2022-04-11 NOTE — CONSULT LETTER
[Dear  ___] : Dear  [unfilled], [Consult Letter:] : I had the pleasure of evaluating your patient, [unfilled]. [Please see my note below.] : Please see my note below. [Consult Closing:] : Thank you very much for allowing me to participate in the care of this patient.  If you have any questions, please do not hesitate to contact me. [Sincerely,] : Sincerely, [FreeTextEntry2] : Erik Benitez MD [FreeTextEntry3] : Lisa Chaney MD\par Attending Physician \par Division of Allergy/Immunology \par [facility] \par \par  of Medicine and Pediatrics\par [facility] \par \par [address]\[place] [address]\par Tel: [phone]\par Fax: [phone]\par Email: [email or web address]\par \par \par \par

## 2023-08-22 ENCOUNTER — APPOINTMENT (OUTPATIENT)
Dept: OBGYN | Facility: CLINIC | Age: 63
End: 2023-08-22

## 2024-02-16 NOTE — SOCIAL HISTORY
independent [Apartment] : [unfilled] lives in an apartment  [None] : none [Smokers in Household] : there are no smokers in the home [de-identified] : studio [de-identified] : lives with BF

## 2024-04-02 ENCOUNTER — OFFICE (OUTPATIENT)
Dept: URBAN - METROPOLITAN AREA CLINIC 27 | Facility: CLINIC | Age: 64
Setting detail: OPHTHALMOLOGY
End: 2024-04-02
Payer: COMMERCIAL

## 2024-04-02 DIAGNOSIS — H21.233: ICD-10-CM

## 2024-04-02 DIAGNOSIS — H01.001: ICD-10-CM

## 2024-04-02 DIAGNOSIS — H18.593: ICD-10-CM

## 2024-04-02 DIAGNOSIS — H25.13: ICD-10-CM

## 2024-04-02 DIAGNOSIS — H16.223: ICD-10-CM

## 2024-04-02 DIAGNOSIS — H02.89: ICD-10-CM

## 2024-04-02 DIAGNOSIS — H11.442: ICD-10-CM

## 2024-04-02 PROBLEM — H01.004 BLEPHARITIS; RIGHT UPPER LID, LEFT UPPER LID , RIGHT LOWER LID, LEFT LOWER LID: Status: ACTIVE | Noted: 2024-04-02

## 2024-04-02 PROBLEM — H01.002 BLEPHARITIS; RIGHT UPPER LID, LEFT UPPER LID , RIGHT LOWER LID, LEFT LOWER LID: Status: ACTIVE | Noted: 2024-04-02

## 2024-04-02 PROBLEM — H01.005 BLEPHARITIS; RIGHT UPPER LID, LEFT UPPER LID , RIGHT LOWER LID, LEFT LOWER LID: Status: ACTIVE | Noted: 2024-04-02

## 2024-04-02 PROCEDURE — 92014 COMPRE OPH EXAM EST PT 1/>: CPT | Performed by: OPHTHALMOLOGY

## 2024-04-02 ASSESSMENT — LID EXAM ASSESSMENTS
OS_BLEPHARITIS: LLL LUL 2+
OD_MEIBOMITIS: RLL RUL 2+
OS_MEIBOMITIS: LLL LUL 1+ 2+
OD_BLEPHARITIS: RLL RUL 2+

## 2024-05-08 ENCOUNTER — RX ONLY (RX ONLY)
Age: 64
End: 2024-05-08

## 2024-05-08 ENCOUNTER — TELEHEALTH (OUTPATIENT)
Dept: URBAN - METROPOLITAN AREA CLINIC 71 | Facility: CLINIC | Age: 64
Setting detail: OPHTHALMOLOGY
End: 2024-05-08
Payer: COMMERCIAL

## 2024-05-08 DIAGNOSIS — H01.001: ICD-10-CM

## 2024-05-08 DIAGNOSIS — H01.004: ICD-10-CM

## 2024-05-08 DIAGNOSIS — H01.005: ICD-10-CM

## 2024-05-08 DIAGNOSIS — H01.002: ICD-10-CM

## 2024-05-08 DIAGNOSIS — H00.15: ICD-10-CM

## 2024-05-08 PROCEDURE — 92285 EXTERNAL OCULAR PHOTOGRAPHY: CPT | Performed by: OPHTHALMOLOGY

## 2024-05-08 PROCEDURE — 92012 INTRM OPH EXAM EST PATIENT: CPT | Performed by: OPHTHALMOLOGY

## 2024-05-08 ASSESSMENT — LID EXAM ASSESSMENTS
OD_MEIBOMITIS: RLL RUL 2+
OS_MEIBOMITIS: LLL LUL 1+ 2+
OD_BLEPHARITIS: RLL RUL 2+
OS_BLEPHARITIS: LLL LUL 2+

## 2024-05-08 ASSESSMENT — CONFRONTATIONAL VISUAL FIELD TEST (CVF)
OS_FINDINGS: FULL
OD_FINDINGS: FULL

## 2024-05-24 ENCOUNTER — OFFICE (OUTPATIENT)
Dept: URBAN - METROPOLITAN AREA CLINIC 35 | Facility: CLINIC | Age: 64
Setting detail: OPHTHALMOLOGY
End: 2024-05-24
Payer: COMMERCIAL

## 2024-05-24 DIAGNOSIS — H00.15: ICD-10-CM

## 2024-05-24 DIAGNOSIS — H01.001: ICD-10-CM

## 2024-05-24 DIAGNOSIS — H01.004: ICD-10-CM

## 2024-05-24 DIAGNOSIS — H01.002: ICD-10-CM

## 2024-05-24 DIAGNOSIS — H01.005: ICD-10-CM

## 2024-05-24 PROCEDURE — 92012 INTRM OPH EXAM EST PATIENT: CPT | Performed by: OPHTHALMOLOGY

## 2024-05-24 ASSESSMENT — LID EXAM ASSESSMENTS
OD_BLEPHARITIS: RLL RUL 2+
OD_MEIBOMITIS: RLL RUL 2+
OS_BLEPHARITIS: LLL LUL 2+
OS_MEIBOMITIS: LLL LUL 1+ 2+

## 2024-05-24 ASSESSMENT — CONFRONTATIONAL VISUAL FIELD TEST (CVF)
OS_FINDINGS: FULL
OD_FINDINGS: FULL

## 2024-07-02 ENCOUNTER — OFFICE (OUTPATIENT)
Dept: URBAN - METROPOLITAN AREA CLINIC 27 | Facility: CLINIC | Age: 64
Setting detail: OPHTHALMOLOGY
End: 2024-07-02
Payer: COMMERCIAL

## 2024-07-02 DIAGNOSIS — H25.13: ICD-10-CM

## 2024-07-02 DIAGNOSIS — B88.0: ICD-10-CM

## 2024-07-02 DIAGNOSIS — H02.89: ICD-10-CM

## 2024-07-02 DIAGNOSIS — H16.223: ICD-10-CM

## 2024-07-02 DIAGNOSIS — H01.001: ICD-10-CM

## 2024-07-02 DIAGNOSIS — H00.15: ICD-10-CM

## 2024-07-02 PROCEDURE — 92012 INTRM OPH EXAM EST PATIENT: CPT | Performed by: OPHTHALMOLOGY

## 2024-07-02 ASSESSMENT — LID EXAM ASSESSMENTS
OD_BLEPHARITIS: RLL RUL 2+
OS_COMMENTS: MODERATE COLLARETTES
OS_MEIBOMITIS: LLL LUL 1+ 2+
OD_COMMENTS: MODERATE COLLARETTES
OD_MEIBOMITIS: RLL RUL 2+
OS_BLEPHARITIS: LLL LUL 2+

## 2024-07-26 ENCOUNTER — DOCTOR'S OFFICE (OUTPATIENT)
Age: 64
Setting detail: OPHTHALMOLOGY
End: 2024-07-26
Payer: COMMERCIAL

## 2024-07-26 DIAGNOSIS — H01.004: ICD-10-CM

## 2024-07-26 DIAGNOSIS — H01.002: ICD-10-CM

## 2024-07-26 DIAGNOSIS — B88.0: ICD-10-CM

## 2024-07-26 DIAGNOSIS — H01.005: ICD-10-CM

## 2024-07-26 DIAGNOSIS — H01.001: ICD-10-CM

## 2024-07-26 DIAGNOSIS — H00.15: ICD-10-CM

## 2024-07-26 PROCEDURE — 92285 EXTERNAL OCULAR PHOTOGRAPHY: CPT | Performed by: OPHTHALMOLOGY

## 2024-07-26 PROCEDURE — 92012 INTRM OPH EXAM EST PATIENT: CPT | Performed by: OPHTHALMOLOGY

## 2024-07-26 ASSESSMENT — LID EXAM ASSESSMENTS
OD_BLEPHARITIS: RLL RUL 2+
OS_BLEPHARITIS: LLL LUL 2+
OS_COMMENTS: MODERATE COLLARETTES
OS_MEIBOMITIS: LLL LUL 1+ 2+
OD_MEIBOMITIS: RLL RUL 2+
OD_COMMENTS: MODERATE COLLARETTES

## 2024-07-26 ASSESSMENT — CONFRONTATIONAL VISUAL FIELD TEST (CVF)
OS_FINDINGS: FULL
OD_FINDINGS: FULL

## 2024-09-24 ENCOUNTER — APPOINTMENT (OUTPATIENT)
Dept: ORTHOPEDIC SURGERY | Facility: CLINIC | Age: 64
End: 2024-09-24

## 2024-09-24 DIAGNOSIS — M25.561 PAIN IN RIGHT KNEE: ICD-10-CM

## 2024-09-24 DIAGNOSIS — G89.29 PAIN IN RIGHT KNEE: ICD-10-CM

## 2024-09-24 DIAGNOSIS — G89.29 PAIN IN LEFT ANKLE AND JOINTS OF LEFT FOOT: ICD-10-CM

## 2024-09-24 DIAGNOSIS — M25.572 PAIN IN LEFT ANKLE AND JOINTS OF LEFT FOOT: ICD-10-CM

## 2024-10-08 ENCOUNTER — APPOINTMENT (OUTPATIENT)
Dept: ORTHOPEDIC SURGERY | Facility: CLINIC | Age: 64
End: 2024-10-08
Payer: COMMERCIAL

## 2024-10-08 VITALS
HEART RATE: 65 BPM | SYSTOLIC BLOOD PRESSURE: 118 MMHG | HEIGHT: 59 IN | WEIGHT: 100 LBS | BODY MASS INDEX: 20.16 KG/M2 | DIASTOLIC BLOOD PRESSURE: 78 MMHG

## 2024-10-08 DIAGNOSIS — M54.12 RADICULOPATHY, CERVICAL REGION: ICD-10-CM

## 2024-10-08 DIAGNOSIS — M25.522 PAIN IN LEFT ELBOW: ICD-10-CM

## 2024-10-08 DIAGNOSIS — M47.812 SPONDYLOSIS W/OUT MYELOPATHY OR RADICULOPATHY, CERVICAL REGION: ICD-10-CM

## 2024-10-08 PROCEDURE — 99204 OFFICE O/P NEW MOD 45 MIN: CPT

## 2024-10-11 ENCOUNTER — APPOINTMENT (OUTPATIENT)
Dept: MRI IMAGING | Facility: CLINIC | Age: 64
End: 2024-10-11

## 2024-10-15 ENCOUNTER — APPOINTMENT (OUTPATIENT)
Dept: ORTHOPEDIC SURGERY | Facility: CLINIC | Age: 64
End: 2024-10-15

## 2024-10-16 ENCOUNTER — APPOINTMENT (OUTPATIENT)
Dept: ORTHOPEDIC SURGERY | Facility: CLINIC | Age: 64
End: 2024-10-16

## 2024-10-16 ENCOUNTER — APPOINTMENT (OUTPATIENT)
Dept: ORTHOPEDIC SURGERY | Facility: CLINIC | Age: 64
End: 2024-10-16
Payer: COMMERCIAL

## 2024-10-16 PROCEDURE — 99213 OFFICE O/P EST LOW 20 MIN: CPT

## 2024-10-21 ENCOUNTER — APPOINTMENT (OUTPATIENT)
Dept: ORTHOPEDIC SURGERY | Facility: CLINIC | Age: 64
End: 2024-10-21
Payer: COMMERCIAL

## 2024-10-21 VITALS — BODY MASS INDEX: 20.16 KG/M2 | WEIGHT: 100 LBS | HEIGHT: 59 IN

## 2024-10-21 DIAGNOSIS — M50.20 OTHER CERVICAL DISC DISPLACEMENT, UNSPECIFIED CERVICAL REGION: ICD-10-CM

## 2024-10-21 DIAGNOSIS — M50.30 OTHER CERVICAL DISC DEGENERATION, UNSPECIFIED CERVICAL REGION: ICD-10-CM

## 2024-10-21 DIAGNOSIS — M54.12 RADICULOPATHY, CERVICAL REGION: ICD-10-CM

## 2024-10-21 PROCEDURE — 99214 OFFICE O/P EST MOD 30 MIN: CPT

## 2024-10-21 RX ORDER — GABAPENTIN 100 MG/1
100 CAPSULE ORAL
Qty: 30 | Refills: 2 | Status: ACTIVE | COMMUNITY
Start: 2024-10-21 | End: 1900-01-01

## 2024-10-25 RX ORDER — DICLOFENAC SODIUM 3 G/100G
3 GEL TOPICAL
Qty: 1 | Refills: 0 | Status: ACTIVE | COMMUNITY
Start: 2024-10-14

## 2024-10-25 RX ORDER — DICLOFENAC SODIUM 10 MG/G
1 GEL TOPICAL
Qty: 1 | Refills: 1 | Status: ACTIVE | COMMUNITY
Start: 2024-10-25 | End: 1900-01-01

## 2025-05-20 ENCOUNTER — OFFICE (OUTPATIENT)
Dept: URBAN - METROPOLITAN AREA CLINIC 27 | Facility: CLINIC | Age: 65
Setting detail: OPHTHALMOLOGY
End: 2025-05-20
Payer: COMMERCIAL

## 2025-05-20 DIAGNOSIS — B88.0: ICD-10-CM

## 2025-05-20 DIAGNOSIS — H25.13: ICD-10-CM

## 2025-05-20 DIAGNOSIS — H01.001: ICD-10-CM

## 2025-05-20 DIAGNOSIS — H16.223: ICD-10-CM

## 2025-05-20 DIAGNOSIS — H02.89: ICD-10-CM

## 2025-05-20 DIAGNOSIS — H10.45: ICD-10-CM

## 2025-05-20 DIAGNOSIS — H00.14: ICD-10-CM

## 2025-05-20 PROCEDURE — 99213 OFFICE O/P EST LOW 20 MIN: CPT | Performed by: OPHTHALMOLOGY

## 2025-05-20 ASSESSMENT — KERATOMETRY
OD_K1POWER_DIOPTERS: 44.75
OS_K1POWER_DIOPTERS: 44.25
OD_K2POWER_DIOPTERS: 45.25
OS_K2POWER_DIOPTERS: 44.75
METHOD_AUTO_MANUAL: AUTO
OD_AXISANGLE_DEGREES: 97
OS_AXISANGLE_DEGREES: 89

## 2025-05-20 ASSESSMENT — REFRACTION_AUTOREFRACTION
OS_SPHERE: +2.00
OD_AXIS: 16
OD_CYLINDER: +1.00
OS_CYLINDER: +0.50
OD_SPHERE: +2.00
OS_AXIS: 176

## 2025-05-20 ASSESSMENT — LID EXAM ASSESSMENTS
OD_MEIBOMITIS: RLL RUL 2+ 3+
OS_MEIBOMITIS: LLL LUL 2+ 3+
OS_BLEPHARITIS: LLL LUL 2+
OD_BLEPHARITIS: RLL RUL 2+

## 2025-05-20 ASSESSMENT — REFRACTION_CURRENTRX
OS_SPHERE: +2.50
OD_SPHERE: +2.25
OS_CYLINDER: SPH
OS_VPRISM_DIRECTION: PROGS
OD_AXIS: 18
OD_CYLINDER: +0.75
OS_ADD: +2.50
OD_VPRISM_DIRECTION: PROGS
OS_OVR_VA: 20/
OD_ADD: +2.50
OD_OVR_VA: 20/

## 2025-05-20 ASSESSMENT — TONOMETRY
OD_IOP_MMHG: 15
OS_IOP_MMHG: 16

## 2025-05-20 ASSESSMENT — SUPERFICIAL PUNCTATE KERATITIS (SPK)
OS_SPK: T 1+
OD_SPK: T 1+

## 2025-05-20 ASSESSMENT — VISUAL ACUITY
OS_BCVA: 20/25-1
OD_BCVA: 20/20

## 2025-05-20 ASSESSMENT — DRY EYES - PHYSICIAN NOTES
OD_GENERALCOMMENTS: INFERIOR LIMBUS
OS_GENERALCOMMENTS: INFERIOR LIMBUS